# Patient Record
Sex: MALE | Race: WHITE | NOT HISPANIC OR LATINO | Employment: FULL TIME | URBAN - METROPOLITAN AREA
[De-identification: names, ages, dates, MRNs, and addresses within clinical notes are randomized per-mention and may not be internally consistent; named-entity substitution may affect disease eponyms.]

---

## 2021-07-06 ENCOUNTER — OFFICE VISIT (OUTPATIENT)
Dept: FAMILY MEDICINE | Facility: CLINIC | Age: 31
End: 2021-07-06
Payer: COMMERCIAL

## 2021-07-06 VITALS
HEIGHT: 70 IN | BODY MASS INDEX: 27.72 KG/M2 | HEART RATE: 73 BPM | SYSTOLIC BLOOD PRESSURE: 130 MMHG | WEIGHT: 193.6 LBS | DIASTOLIC BLOOD PRESSURE: 87 MMHG | TEMPERATURE: 98.3 F | OXYGEN SATURATION: 100 %

## 2021-07-06 DIAGNOSIS — Z72.0 TOBACCO ABUSE: ICD-10-CM

## 2021-07-06 DIAGNOSIS — Z11.59 NEED FOR HEPATITIS C SCREENING TEST: ICD-10-CM

## 2021-07-06 DIAGNOSIS — Z86.2 HISTORY OF MYELODYSPLASTIC SYNDROME: ICD-10-CM

## 2021-07-06 DIAGNOSIS — K64.9 HEMORRHOIDS, UNSPECIFIED HEMORRHOID TYPE: ICD-10-CM

## 2021-07-06 DIAGNOSIS — Z00.00 ROUTINE GENERAL MEDICAL EXAMINATION AT A HEALTH CARE FACILITY: Primary | ICD-10-CM

## 2021-07-06 LAB
BASOPHILS # BLD AUTO: 0 10E9/L (ref 0–0.2)
BASOPHILS NFR BLD AUTO: 0.3 %
DIFFERENTIAL METHOD BLD: NORMAL
EOSINOPHIL # BLD AUTO: 0.1 10E9/L (ref 0–0.7)
EOSINOPHIL NFR BLD AUTO: 1.3 %
ERYTHROCYTE [DISTWIDTH] IN BLOOD BY AUTOMATED COUNT: 12.4 % (ref 10–15)
HCT VFR BLD AUTO: 48.8 % (ref 40–53)
HGB BLD-MCNC: 16.5 G/DL (ref 13.3–17.7)
LYMPHOCYTES # BLD AUTO: 1.6 10E9/L (ref 0.8–5.3)
LYMPHOCYTES NFR BLD AUTO: 19.9 %
MCH RBC QN AUTO: 28.5 PG (ref 26.5–33)
MCHC RBC AUTO-ENTMCNC: 33.8 G/DL (ref 31.5–36.5)
MCV RBC AUTO: 84 FL (ref 78–100)
MONOCYTES # BLD AUTO: 0.4 10E9/L (ref 0–1.3)
MONOCYTES NFR BLD AUTO: 4.5 %
NEUTROPHILS # BLD AUTO: 5.7 10E9/L (ref 1.6–8.3)
NEUTROPHILS NFR BLD AUTO: 74 %
PLATELET # BLD AUTO: 234 10E9/L (ref 150–450)
RBC # BLD AUTO: 5.79 10E12/L (ref 4.4–5.9)
RETICS # AUTO: 148.8 10E9/L (ref 25–95)
RETICS/RBC NFR AUTO: 2.6 % (ref 0.5–2)
WBC # BLD AUTO: 7.8 10E9/L (ref 4–11)

## 2021-07-06 PROCEDURE — 85060 BLOOD SMEAR INTERPRETATION: CPT | Performed by: PATHOLOGY

## 2021-07-06 PROCEDURE — 36415 COLL VENOUS BLD VENIPUNCTURE: CPT | Performed by: PHYSICIAN ASSISTANT

## 2021-07-06 PROCEDURE — 85045 AUTOMATED RETICULOCYTE COUNT: CPT | Performed by: PHYSICIAN ASSISTANT

## 2021-07-06 PROCEDURE — 99213 OFFICE O/P EST LOW 20 MIN: CPT | Mod: 25 | Performed by: PHYSICIAN ASSISTANT

## 2021-07-06 PROCEDURE — 99N1109 PR STATISTIC MORPHOLOGY W/INTERP HISTOLOGY TC 85060: Performed by: PHYSICIAN ASSISTANT

## 2021-07-06 PROCEDURE — 99385 PREV VISIT NEW AGE 18-39: CPT | Performed by: PHYSICIAN ASSISTANT

## 2021-07-06 PROCEDURE — 86803 HEPATITIS C AB TEST: CPT | Performed by: PHYSICIAN ASSISTANT

## 2021-07-06 PROCEDURE — 85025 COMPLETE CBC W/AUTO DIFF WBC: CPT | Performed by: PHYSICIAN ASSISTANT

## 2021-07-06 ASSESSMENT — ENCOUNTER SYMPTOMS
HEARTBURN: 1
MYALGIAS: 0
HEMATURIA: 0
JOINT SWELLING: 0
DIZZINESS: 0
FREQUENCY: 0
WEAKNESS: 0
DYSURIA: 0
FEVER: 0
SORE THROAT: 0
EYE PAIN: 0
CHILLS: 0
NERVOUS/ANXIOUS: 0
PALPITATIONS: 0
HEMATOCHEZIA: 1
PARESTHESIAS: 0
SHORTNESS OF BREATH: 0
COUGH: 0
ABDOMINAL PAIN: 0
DIARRHEA: 0
HEADACHES: 0
CONSTIPATION: 1
ARTHRALGIAS: 0
NAUSEA: 0

## 2021-07-06 ASSESSMENT — PAIN SCALES - GENERAL: PAINLEVEL: NO PAIN (0)

## 2021-07-06 ASSESSMENT — MIFFLIN-ST. JEOR: SCORE: 1844.41

## 2021-07-06 NOTE — PATIENT INSTRUCTIONS
Preventive Health Recommendations  Male Ages 26 - 39    Yearly exam:             See your health care provider every year in order to  o   Review health changes.   o   Discuss preventive care.    o   Review your medicines if your doctor has prescribed any.    You should be tested each year for STDs (sexually transmitted diseases), if you re at risk.     After age 35, talk to your provider about cholesterol testing. If you are at risk for heart disease, have your cholesterol tested at least every 5 years.     If you are at risk for diabetes, you should have a diabetes test (fasting glucose).  Shots: Get a flu shot each year. Get a tetanus shot every 10 years.     Nutrition:    Eat at least 5 servings of fruits and vegetables daily.     Eat whole-grain bread, whole-wheat pasta and brown rice instead of white grains and rice.     Get adequate Calcium and Vitamin D.     Lifestyle    Exercise for at least 150 minutes a week (30 minutes a day, 5 days a week). This will help you control your weight and prevent disease.     Limit alcohol to one drink per day.     No smoking.     Wear sunscreen to prevent skin cancer.     See your dentist every six months for an exam and cleaning.     Patient Education              Patient Education     Hemorrhoids    Hemorrhoids are swollen and inflamed veins inside the rectum and near the anus. The rectum is the last several inches of the colon. The anus is the passage between the rectum and the outside of the body.   Causes  The veins can become swollen due to increased pressure in them. This is most often caused by:     Chronic constipation or diarrhea    Straining when having a bowel movement    Sitting too long on the toilet    A low-fiber diet    Pregnancy  Symptoms    Bleeding from the rectum. You may notice this after bowel movements.    Lump near the anus    Itching around the anus    Pain around the anus    Mucus leaks from the anus  There are different types of hemorrhoids.  Depending on the type you have and the severity, you may be able to treat yourself at home. In some cases, a procedure may be the best treatment option. Your healthcare provider can tell you more about this, if needed.   Home care  General care    To get relief from pain or itching, try:  ? Medicines. Your healthcare provider may recommend stool softeners, suppositories, or laxatives to help manage constipation. Use these exactly as directed.  ? Sitz baths. A sitz bath involves sitting in a few inches of warm bath water. Be careful not to make the water so hot that you burn yourself--test it before sitting in it. Soak for about 10 to 15 minutes a few times a day. This may help relieve pain.  ? Topical products. Your healthcare provider may prescribe or recommend creams, ointments, or pads that can be applied to the hemorrhoid. Use these exactly as directed.  Tips to help prevent hemorrhoids     Eat more fiber. Fiber adds bulk to stool and absorbs water as it moves through your colon. This makes stool softer and easier to pass.  ? Increase the fiber in your diet with more fiber-rich foods. These include fresh fruit, vegetables, and whole grains.  ? Take a fiber supplement or bulking agent, if advised by your healthcare provider. These include products such as psyllium or methylcellulose.    Drink more water. Your healthcare provider may direct you to drink plenty of water. This can help keep stool soft.    Be more active. Frequent exercise aids digestion and helps prevent constipation. It may also help make bowel movements more regular.    Don t strain during bowel movements. This can make hemorrhoids more likely. Also, don t sit on the toilet for long periods of time.    Follow-up care  Follow up with your healthcare provider as advised. If a culture or imaging tests were done, someone will let you know the results when they are ready. This may take a few days or longer. If your healthcare provider recommends a  procedure for your hemorrhoids, these options can be discussed. Options may include surgery and outpatient office treatments.   When to seek medical advice  Call your healthcare provider right away if any of these occur:     Increased bleeding from the rectum    Increased pain around the rectum or anus    Weakness or dizziness  Call 911  Call 911 if any of these occur:     Trouble breathing or swallowing    Fainting or loss of consciousness    Unusually fast heart rate    Vomiting blood    Large amounts of blood in stool or black, tarry stools  [employer] last reviewed this educational content on 8/1/2019 2000-2021 [employer]. All rights reserved. This information is not intended as a substitute for professional medical care. Always follow your healthcare professional's instructions.         [employer] [facility] is for any [place] looking for free support to quit smoking, vaping or chewing.   [facility] [facility] will offer many quit support options and resources so [place] residents can continue to find the way to quit that works best for them.   Free support includes personalized coaching, email and text support, educational materials, and quit medication (nicotine patches, gum or lozenges) delivered by mail.     Contact [employer] at [email or web address] or online at [employer] to receive support on your quit journey.

## 2021-07-06 NOTE — PROGRESS NOTES
SUBJECTIVE:   CC: Misael Fontana is an 30 year old male who presents for preventative health visit.    He hasn't been to the doctors since about 2012 and wants to be better about taking car of himself. He does not have any immediate health issues to talk about but does endorse ongoing issues with hemorroids managed by OTC preparation H, smoking addiction,heartburn especially after drinking alcohol, and a history of mylodisplasia that he was treated for as a kid and is curious about how his blood work looks now. He does not endorse any fever, fatigue, dizziness, nausea, abdominal pain, chest pain, or problems breathing.       Patient has been advised of split billing requirements and indicates understanding: Yes  Healthy Habits:     Getting at least 3 servings of Calcium per day:  Yes    Bi-annual eye exam:  Yes    Dental care twice a year:  NO    Sleep apnea or symptoms of sleep apnea:  None    Diet:  Regular (no restrictions)    Frequency of exercise:  1 day/week    Duration of exercise:  30-45 minutes    Taking medications regularly:  Not Applicable    Barriers to taking medications:  Not applicable    Medication side effects:  Not applicable    PHQ-2 Total Score: 0    Additional concerns today:  Yes      Today's PHQ-2 Score:   PHQ-2 ( 1999 Pfizer) 7/6/2021   Q1: Little interest or pleasure in doing things 0   Q2: Feeling down, depressed or hopeless 0   PHQ-2 Score 0   Q1: Little interest or pleasure in doing things Not at all   Q2: Feeling down, depressed or hopeless Not at all   PHQ-2 Score 0       Abuse: Current or Past(Physical, Sexual or Emotional)- NO  Do you feel safe in your environment? YES    Have you ever done Advance Care Planning? (For example, a Health Directive, POLST, or a discussion with a medical provider or your loved ones about your wishes): Declined    Social History     Tobacco Use     Smoking status: Current Every Day Smoker     Smokeless tobacco: Never Used   Substance Use Topics      Alcohol use: Yes     Alcohol/week: 0.0 standard drinks     Comment: weekly     Smoking-Starting to prioritize quitting. Discussed medication options  Alcohol-  2/day with friends 5 during the weekend. No concerns at this moment (endorsed alcohol and drug abuse run in family)    Alcohol Use 7/6/2021   Prescreen: >3 drinks/day or >7 drinks/week? Yes   AUDIT SCORE  8     AUDIT - Alcohol Use Disorders Identification Test - Reproduced from the World Health Organization Audit 2001 (Second Edition) 7/6/2021   1.  How often do you have a drink containing alcohol? 2 to 3 times a week   2.  How many drinks containing alcohol do you have on a typical day when you are drinking? 1 or 2   3.  How often do you have five or more drinks on one occasion? Weekly   4.  How often during the last year have you found that you were not able to stop drinking once you had started? Never   5.  How often during the last year have you failed to do what was normally expected of you because of drinking? Never   6.  How often during the last year have you needed a first drink in the morning to get yourself going after a heavy drinking session? Never   7.  How often during the last year have you had a feeling of guilt or remorse after drinking? Less than monthly   8.  How often during the last year have you been unable to remember what happened the night before because of your drinking? Less than monthly   9.  Have you or someone else been injured because of your drinking? No   10. Has a relative, friend, doctor or other health care worker been concerned about your drinking or suggested you cut down? No   TOTAL SCORE 8        Last PSA: No results found for: PSA      Reviewed and updated as needed this visit by clinical staff  Tobacco  Allergies  Meds   Med Hx  Surg Hx  Fam Hx  Soc Hx      PMHx -myelodysplasia as a child (pt unsure of time frame)    FMHx- No pertinent family hx    Reviewed and updated as needed this visit by Provider           "         Review of Systems   Constitutional: Negative for chills and fever.   HENT: Negative for congestion, ear pain, hearing loss and sore throat.    Eyes: Negative for pain and visual disturbance.   Respiratory: Negative for cough and shortness of breath.    Cardiovascular: Negative for chest pain, palpitations and peripheral edema.   Gastrointestinal: Positive for constipation, heartburn and hematochezia. Negative for abdominal pain, diarrhea and nausea.        Heartburn typically after drinking alcohol.  Has had difficulty with BMs being hard since he was 18. BM 2-3x/ day.  Has had chronic hemorroids, not officially diagnosed, but uses otc preperation H when needed. Only bleeds in association with hemorrhoid exacerbation. It has been 6 months since last bleeding episode.    Genitourinary: Negative for discharge, dysuria, frequency, genital sores, hematuria, impotence and urgency.   Musculoskeletal: Negative for arthralgias, joint swelling and myalgias.   Skin: Negative for rash.   Neurological: Negative for dizziness, weakness, headaches and paresthesias.   Psychiatric/Behavioral: Negative for mood changes. The patient is not nervous/anxious.          OBJECTIVE:   /87 (BP Location: Left arm, Patient Position: Chair, Cuff Size: Adult Regular)   Pulse 73   Temp 98.3  F (36.8  C) (Tympanic)   Ht 1.778 m (5' 10\")   Wt 87.8 kg (193 lb 9.6 oz)   SpO2 100%   BMI 27.78 kg/m      Physical Exam  GENERAL: healthy, alert and no distress  EYES: Eyes grossly normal to inspection, PERRL and conjunctivae and sclerae normal  HENT: ear canals and TM's normal, nose and mouth without ulcers or lesions  NECK: no adenopathy, no asymmetry, masses, or scars and thyroid normal to palpation  RESP: lungs clear to auscultation - no rales, rhonchi or wheezes  CV: regular rate and rhythm, normal S1 S2, no S3 or S4, no murmur, click or rub, no peripheral edema and peripheral pulses strong  ABDOMEN: soft, nontender, no " hepatosplenomegaly, no masses and bowel sounds normal  MS: no gross musculoskeletal defects noted, no edema  SKIN: no suspicious lesions or rashes  NEURO: Normal strength and tone, mentation intact and speech normal  PSYCH: mentation appears normal, affect normal/bright        ASSESSMENT/PLAN:   Misael was seen today for physical.    Diagnoses and all orders for this visit:    Routine general medical examination at a health care facility    Tobacco abuse    Hemorrhoids, unspecified hemorrhoid type    History of myelodysplastic syndrome  -     Blood Morphology Pathologist Review  -     CBC with platelets differential  -     Reticulocyte Count    Need for hepatitis C screening test  -     Hepatitis C Screen Reflex to HCV RNA Quant and Genotype    Misael is a well appearing 29yo M that presents for routine physical exam. While his physical exam is grossly unremarkable. Discussed continued management of his hemorrhoids with OTC preparation H and discussed implementing Metamucil, increasing dietary fiber, and apple/ prune juice to aid in the quality of BMs. Will continue to follow and look into possible surgical options if symptoms get worse or more frequent. Discussed options for smoking cessation such as medications and counseling. He is not ready now, but will revisit when he is ready. Discussed better monitoring of alcohol and healthy food intake to help with heartburn as well as potentially help with other GI symptoms. Talked about using Debrox as opposed to Q tips for earwax removal, to prevent potential of impaction or perforation. In addition to routine preventative tests, collect blood test to screen for any abnormalities given hx of mylodisplastic syndrome as a child. Also discussed general health maintenance of monitoring diet, exercise and substance abuse to maintain a healthy lifestyle. Misael is in agreement with this plan.    Patient has been advised of split billing requirements and indicates understanding:  "Yes  COUNSELING:   Reviewed preventive health counseling, as reflected in patient instructions       Regular exercise       Healthy diet/nutrition       Alcohol Use     Estimated body mass index is 27.78 kg/m  as calculated from the following:    Height as of this encounter: 1.778 m (5' 10\").    Weight as of this encounter: 87.8 kg (193 lb 9.6 oz).     Weight management plan: Discussed healthy diet and exercise guidelines    He reports that he has been smoking. He has never used smokeless tobacco.  Tobacco Cessation Action Plan:   Information offered: Patient not interested at this time      Counseling Resources:  ATP IV Guidelines  Pooled Cohorts Equation Calculator  FRAX Risk Assessment  ICSI Preventive Guidelines  Dietary Guidelines for Americans, 2010  USDA's MyPlate  ASA Prophylaxis  Lung CA Screening    -CHOLO Mosqueda-S      Patient evaluated independently by me.  History, exam, and plan personally completed  CHOLO Jennings  [facility]  "

## 2021-07-07 LAB — HCV AB SERPL QL IA: NONREACTIVE

## 2021-07-08 LAB — COPATH REPORT: NORMAL

## 2021-09-11 ENCOUNTER — HEALTH MAINTENANCE LETTER (OUTPATIENT)
Age: 31
End: 2021-09-11

## 2021-12-22 ENCOUNTER — IMMUNIZATION (OUTPATIENT)
Dept: NURSING | Facility: CLINIC | Age: 31
End: 2021-12-22
Payer: COMMERCIAL

## 2021-12-22 PROCEDURE — 0064A COVID-19,PF,MODERNA (18+ YRS BOOSTER .25ML): CPT

## 2021-12-22 PROCEDURE — 91306 COVID-19,PF,MODERNA (18+ YRS BOOSTER .25ML): CPT

## 2022-08-13 ENCOUNTER — HEALTH MAINTENANCE LETTER (OUTPATIENT)
Age: 32
End: 2022-08-13

## 2022-10-27 ENCOUNTER — OFFICE VISIT (OUTPATIENT)
Dept: FAMILY MEDICINE | Facility: CLINIC | Age: 32
End: 2022-10-27
Payer: COMMERCIAL

## 2022-10-27 VITALS
HEART RATE: 87 BPM | SYSTOLIC BLOOD PRESSURE: 126 MMHG | WEIGHT: 194.6 LBS | TEMPERATURE: 98 F | DIASTOLIC BLOOD PRESSURE: 88 MMHG | OXYGEN SATURATION: 96 % | HEIGHT: 70 IN | BODY MASS INDEX: 27.86 KG/M2

## 2022-10-27 DIAGNOSIS — Z86.2 HISTORY OF MYELODYSPLASTIC SYNDROME: ICD-10-CM

## 2022-10-27 DIAGNOSIS — N52.9 ERECTILE DYSFUNCTION, UNSPECIFIED ERECTILE DYSFUNCTION TYPE: ICD-10-CM

## 2022-10-27 DIAGNOSIS — R41.840 INATTENTION: ICD-10-CM

## 2022-10-27 DIAGNOSIS — Z11.4 SCREENING FOR HIV (HUMAN IMMUNODEFICIENCY VIRUS): Primary | ICD-10-CM

## 2022-10-27 DIAGNOSIS — Z13.6 ENCOUNTER FOR LIPID SCREENING FOR CARDIOVASCULAR DISEASE: ICD-10-CM

## 2022-10-27 DIAGNOSIS — Z00.00 ROUTINE GENERAL MEDICAL EXAMINATION AT A HEALTH CARE FACILITY: ICD-10-CM

## 2022-10-27 DIAGNOSIS — Z13.220 ENCOUNTER FOR LIPID SCREENING FOR CARDIOVASCULAR DISEASE: ICD-10-CM

## 2022-10-27 LAB
ALBUMIN SERPL-MCNC: 4.4 G/DL (ref 3.4–5)
ALP SERPL-CCNC: 55 U/L (ref 40–150)
ALT SERPL W P-5'-P-CCNC: 27 U/L (ref 0–70)
ANION GAP SERPL CALCULATED.3IONS-SCNC: 4 MMOL/L (ref 3–14)
AST SERPL W P-5'-P-CCNC: 14 U/L (ref 0–45)
BASOPHILS # BLD AUTO: 0.1 10E3/UL (ref 0–0.2)
BASOPHILS NFR BLD AUTO: 1 %
BILIRUB SERPL-MCNC: 0.5 MG/DL (ref 0.2–1.3)
BUN SERPL-MCNC: 13 MG/DL (ref 7–30)
CALCIUM SERPL-MCNC: 9.6 MG/DL (ref 8.5–10.1)
CHLORIDE BLD-SCNC: 108 MMOL/L (ref 94–109)
CHOLEST SERPL-MCNC: 193 MG/DL
CO2 SERPL-SCNC: 28 MMOL/L (ref 20–32)
CREAT SERPL-MCNC: 0.94 MG/DL (ref 0.66–1.25)
EOSINOPHIL # BLD AUTO: 0.2 10E3/UL (ref 0–0.7)
EOSINOPHIL NFR BLD AUTO: 3 %
ERYTHROCYTE [DISTWIDTH] IN BLOOD BY AUTOMATED COUNT: 13.3 % (ref 10–15)
FASTING STATUS PATIENT QL REPORTED: ABNORMAL
GFR SERPL CREATININE-BSD FRML MDRD: >90 ML/MIN/1.73M2
GLUCOSE BLD-MCNC: 100 MG/DL (ref 70–99)
HBA1C MFR BLD: 4.7 % (ref 0–5.6)
HCT VFR BLD AUTO: 47.1 % (ref 40–53)
HDLC SERPL-MCNC: 33 MG/DL
HGB BLD-MCNC: 16.1 G/DL (ref 13.3–17.7)
LDLC SERPL CALC-MCNC: 129 MG/DL
LYMPHOCYTES # BLD AUTO: 2.3 10E3/UL (ref 0.8–5.3)
LYMPHOCYTES NFR BLD AUTO: 33 %
MCH RBC QN AUTO: 28.7 PG (ref 26.5–33)
MCHC RBC AUTO-ENTMCNC: 34.2 G/DL (ref 31.5–36.5)
MCV RBC AUTO: 84 FL (ref 78–100)
MONOCYTES # BLD AUTO: 0.5 10E3/UL (ref 0–1.3)
MONOCYTES NFR BLD AUTO: 8 %
NEUTROPHILS # BLD AUTO: 3.9 10E3/UL (ref 1.6–8.3)
NEUTROPHILS NFR BLD AUTO: 56 %
NONHDLC SERPL-MCNC: 160 MG/DL
PLATELET # BLD AUTO: 246 10E3/UL (ref 150–450)
POTASSIUM BLD-SCNC: 4.3 MMOL/L (ref 3.4–5.3)
PROT SERPL-MCNC: 7.3 G/DL (ref 6.8–8.8)
RBC # BLD AUTO: 5.61 10E6/UL (ref 4.4–5.9)
SODIUM SERPL-SCNC: 140 MMOL/L (ref 133–144)
TRIGL SERPL-MCNC: 155 MG/DL
WBC # BLD AUTO: 7.1 10E3/UL (ref 4–11)

## 2022-10-27 PROCEDURE — 99395 PREV VISIT EST AGE 18-39: CPT | Performed by: FAMILY MEDICINE

## 2022-10-27 PROCEDURE — 80061 LIPID PANEL: CPT | Performed by: FAMILY MEDICINE

## 2022-10-27 PROCEDURE — 80053 COMPREHEN METABOLIC PANEL: CPT | Performed by: FAMILY MEDICINE

## 2022-10-27 PROCEDURE — 85025 COMPLETE CBC W/AUTO DIFF WBC: CPT | Performed by: FAMILY MEDICINE

## 2022-10-27 PROCEDURE — 83036 HEMOGLOBIN GLYCOSYLATED A1C: CPT | Performed by: FAMILY MEDICINE

## 2022-10-27 PROCEDURE — 36415 COLL VENOUS BLD VENIPUNCTURE: CPT | Performed by: FAMILY MEDICINE

## 2022-10-27 RX ORDER — SILDENAFIL 50 MG/1
50 TABLET, FILM COATED ORAL DAILY PRN
Qty: 10 TABLET | Refills: 3 | Status: SHIPPED | OUTPATIENT
Start: 2022-10-27

## 2022-10-27 ASSESSMENT — ENCOUNTER SYMPTOMS
DIZZINESS: 0
HEADACHES: 0
ABDOMINAL PAIN: 0
COUGH: 0
NERVOUS/ANXIOUS: 0
EYE PAIN: 0
ARTHRALGIAS: 0
CHILLS: 0
DYSURIA: 0
SHORTNESS OF BREATH: 0
HEMATURIA: 0
FREQUENCY: 0
NAUSEA: 0
SORE THROAT: 0
MYALGIAS: 0
CONSTIPATION: 0
DIARRHEA: 0
PALPITATIONS: 0
PARESTHESIAS: 0
HEARTBURN: 0
HEMATOCHEZIA: 1
JOINT SWELLING: 0
WEAKNESS: 0
FEVER: 0

## 2022-10-27 NOTE — PATIENT INSTRUCTIONS
Preventive Health Recommendations  Male Ages 26 - 39    Yearly exam:             See your health care provider every year in order to  o   Review health changes.   o   Discuss preventive care.    o   Review your medicines if your doctor has prescribed any.  You should be tested each year for STDs (sexually transmitted diseases), if you re at risk.   After age 35, talk to your provider about cholesterol testing. If you are at risk for heart disease, have your cholesterol tested at least every 5 years.   If you are at risk for diabetes, you should have a diabetes test (fasting glucose).  Shots: Get a flu shot each year. Get a tetanus shot every 10 years.     Nutrition:  Eat at least 5 servings of fruits and vegetables daily.   Eat whole-grain bread, whole-wheat pasta and brown rice instead of white grains and rice.   Get adequate Calcium and Vitamin D.     Lifestyle  Exercise for at least 150 minutes a week (30 minutes a day, 5 days a week). This will help you control your weight and prevent disease.   Limit alcohol to one drink per day.   No smoking.   Wear sunscreen to prevent skin cancer.   See your dentist every six months for an exam and cleaning.

## 2022-10-27 NOTE — PROGRESS NOTES
SUBJECTIVE:   CC: Misael is an 31 year old who presents for preventative health visit.   Patient has been advised of split billing requirements and indicates understanding: Yes  Healthy Habits:     Getting at least 3 servings of Calcium per day:  NO    Bi-annual eye exam:  Yes    Dental care twice a year:  NO    Sleep apnea or symptoms of sleep apnea:  None    Diet:  Breakfast skipped    Frequency of exercise:  1 day/week    Duration of exercise:  15-30 minutes    Taking medications regularly:  Not Applicable    Medication side effects:  Not applicable    PHQ-2 Total Score: 0    Additional concerns today:  No    GGF - colon cancer    Hemorrhoids - chronic    Sales - concern for ADHD, wants to be screened          Today's PHQ-2 Score:   PHQ-2 ( 1999 Pfizer) 10/27/2022   Q1: Little interest or pleasure in doing things 0   Q2: Feeling down, depressed or hopeless 0   PHQ-2 Score 0   PHQ-2 Total Score (12-17 Years)- Positive if 3 or more points; Administer PHQ-A if positive -   Q1: Little interest or pleasure in doing things Not at all   Q2: Feeling down, depressed or hopeless Not at all   PHQ-2 Score 0       Abuse: Current or Past(Physical, Sexual or Emotional)- No  Do you feel safe in your environment? Yes    Have you ever done Advance Care Planning? (For example, a Health Directive, POLST, or a discussion with a medical provider or your loved ones about your wishes): No, advance care planning information given to patient to review.  Patient declined advance care planning discussion at this time.    Social History     Tobacco Use     Smoking status: Every Day     Smokeless tobacco: Never   Substance Use Topics     Alcohol use: Yes     Alcohol/week: 0.0 standard drinks     Comment: weekly     If you drink alcohol do you typically have >3 drinks per day or >7 drinks per week? No    Alcohol Use 10/27/2022   Prescreen: >3 drinks/day or >7 drinks/week? No   Prescreen: >3 drinks/day or >7 drinks/week? -   AUDIT SCORE  -      AUDIT - Alcohol Use Disorders Identification Test - Reproduced from the World Health Organization Audit 2001 (Second Edition) 7/6/2021   1.  How often do you have a drink containing alcohol? 2 to 3 times a week   2.  How many drinks containing alcohol do you have on a typical day when you are drinking? 1 or 2   3.  How often do you have five or more drinks on one occasion? Weekly   4.  How often during the last year have you found that you were not able to stop drinking once you had started? Never   5.  How often during the last year have you failed to do what was normally expected of you because of drinking? Never   6.  How often during the last year have you needed a first drink in the morning to get yourself going after a heavy drinking session? Never   7.  How often during the last year have you had a feeling of guilt or remorse after drinking? Less than monthly   8.  How often during the last year have you been unable to remember what happened the night before because of your drinking? Less than monthly   9.  Have you or someone else been injured because of your drinking? No   10. Has a relative, friend, doctor or other health care worker been concerned about your drinking or suggested you cut down? No   TOTAL SCORE 8       Last PSA: No results found for: PSA    Reviewed orders with patient. Reviewed health maintenance and updated orders accordingly - Yes  BP Readings from Last 3 Encounters:   10/27/22 126/88   07/06/21 130/87   10/20/15 122/64    Wt Readings from Last 3 Encounters:   10/27/22 88.3 kg (194 lb 9.6 oz)   07/06/21 87.8 kg (193 lb 9.6 oz)   10/20/15 84.8 kg (187 lb)                    Reviewed and updated as needed this visit by clinical staff   Tobacco  Allergies  Meds              Reviewed and updated as needed this visit by Provider                     Review of Systems   Constitutional: Negative for chills and fever.   HENT: Negative for congestion, ear pain, hearing loss and sore  "throat.    Eyes: Negative for pain and visual disturbance.   Respiratory: Negative for cough and shortness of breath.    Cardiovascular: Negative for chest pain, palpitations and peripheral edema.   Gastrointestinal: Positive for hematochezia. Negative for abdominal pain, constipation, diarrhea, heartburn and nausea.   Genitourinary: Positive for impotence. Negative for dysuria, frequency, genital sores, hematuria, penile discharge and urgency.   Musculoskeletal: Negative for arthralgias, joint swelling and myalgias.   Skin: Negative for rash.   Neurological: Negative for dizziness, weakness, headaches and paresthesias.   Psychiatric/Behavioral: Negative for mood changes. The patient is not nervous/anxious.          OBJECTIVE:   /88   Pulse 87   Temp 98  F (36.7  C) (Oral)   Ht 1.79 m (5' 10.47\")   Wt 88.3 kg (194 lb 9.6 oz)   SpO2 96%   BMI 27.55 kg/m      Physical Exam  Constitutional:       General: He is not in acute distress.     Appearance: He is well-developed. He is not ill-appearing.   HENT:      Head: Normocephalic and atraumatic.      Right Ear: Tympanic membrane, ear canal and external ear normal.      Left Ear: Tympanic membrane, ear canal and external ear normal.      Nose: Nose normal.      Mouth/Throat:      Mouth: Mucous membranes are moist. No oral lesions.   Eyes:      Extraocular Movements: Extraocular movements intact.      Conjunctiva/sclera: Conjunctivae normal.   Neck:      Thyroid: No thyromegaly.      Trachea: No tracheal deviation.   Cardiovascular:      Rate and Rhythm: Normal rate and regular rhythm.      Heart sounds: Normal heart sounds, S1 normal and S2 normal. No murmur heard.    No S3 or S4 sounds.   Pulmonary:      Effort: Pulmonary effort is normal. No respiratory distress.      Breath sounds: Normal breath sounds. No wheezing or rales.   Abdominal:      General: Bowel sounds are normal.      Palpations: Abdomen is soft. There is no mass.      Tenderness: There is no " abdominal tenderness.   Musculoskeletal:         General: No deformity. Normal range of motion.      Cervical back: Neck supple.   Lymphadenopathy:      Cervical: No cervical adenopathy.   Skin:     General: Skin is warm and dry.      Findings: No lesion or rash.   Neurological:      Mental Status: He is alert and oriented to person, place, and time.      Motor: No abnormal muscle tone.      Deep Tendon Reflexes: Reflexes are normal and symmetric.   Psychiatric:         Speech: Speech normal.         Behavior: Behavior normal.         Thought Content: Thought content normal.         Judgment: Judgment normal.               ASSESSMENT/PLAN:       ICD-10-CM    1. Screening for HIV (human immunodeficiency virus)  Z11.4       2. Routine general medical examination at a health care facility  Z00.00 REVIEW OF HEALTH MAINTENANCE PROTOCOL ORDERS     Lipid panel reflex to direct LDL Fasting     Comprehensive metabolic panel     Hemoglobin A1c     Lipid panel reflex to direct LDL Fasting     Comprehensive metabolic panel     Hemoglobin A1c      3. Encounter for lipid screening for cardiovascular disease  Z13.220 Lipid panel reflex to direct LDL Fasting    Z13.6 Lipid panel reflex to direct LDL Fasting      4. BMI 27.0-27.9,adult  Z68.27 Hemoglobin A1c     Hemoglobin A1c      5. Erectile dysfunction, unspecified erectile dysfunction type  N52.9 sildenafil (VIAGRA) 50 MG tablet      6. Inattention  R41.840 Adult Mental Health  Referral      7. History of myelodysplastic syndrome  Z86.2 CBC with platelets and differential     Lab Blood Morphology Pathologist Review     CBC with platelets and differential          Patient has been advised of split billing requirements and indicates understanding: Yes      COUNSELING:   Reviewed preventive health counseling, as reflected in patient instructions       Regular exercise       Healthy diet/nutrition    Estimated body mass index is 27.55 kg/m  as calculated from the  "following:    Height as of this encounter: 1.79 m (5' 10.47\").    Weight as of this encounter: 88.3 kg (194 lb 9.6 oz).         He reports that he has been smoking. He has never used smokeless tobacco.  Nicotine/Tobacco Cessation Plan:   Information offered: Patient not interested at this time      Counseling Resources:  ATP IV Guidelines  Pooled Cohorts Equation Calculator  FRAX Risk Assessment  ICSI Preventive Guidelines  Dietary Guidelines for Americans, 2010  USDA's MyPlate  ASA Prophylaxis  Lung CA Screening    Arnie Cook MD  [facility]  "

## 2022-10-29 ENCOUNTER — HEALTH MAINTENANCE LETTER (OUTPATIENT)
Age: 32
End: 2022-10-29

## 2023-05-01 ENCOUNTER — VIRTUAL VISIT (OUTPATIENT)
Dept: PSYCHOLOGY | Facility: CLINIC | Age: 33
End: 2023-05-01
Attending: FAMILY MEDICINE
Payer: COMMERCIAL

## 2023-05-01 DIAGNOSIS — F41.9 ANXIETY DISORDER, UNSPECIFIED TYPE: Primary | ICD-10-CM

## 2023-05-01 DIAGNOSIS — R41.840 INATTENTION: ICD-10-CM

## 2023-05-01 PROCEDURE — 90834 PSYTX W PT 45 MINUTES: CPT | Mod: VID

## 2023-05-01 ASSESSMENT — COLUMBIA-SUICIDE SEVERITY RATING SCALE - C-SSRS
6. HAVE YOU EVER DONE ANYTHING, STARTED TO DO ANYTHING, OR PREPARED TO DO ANYTHING TO END YOUR LIFE?: NO
2. HAVE YOU ACTUALLY HAD ANY THOUGHTS OF KILLING YOURSELF IN THE PAST MONTH?: NO
4. HAVE YOU HAD THESE THOUGHTS AND HAD SOME INTENTION OF ACTING ON THEM?: NO
1. IN THE PAST MONTH, HAVE YOU WISHED YOU WERE DEAD OR WISHED YOU COULD GO TO SLEEP AND NOT WAKE UP?: NO
5. HAVE YOU STARTED TO WORK OUT OR WORKED OUT THE DETAILS OF HOW TO KILL YOURSELF? DO YOU INTEND TO CARRY OUT THIS PLAN?: NO
3. HAVE YOU BEEN THINKING ABOUT HOW YOU MIGHT KILL YOURSELF?: NO
2. HAVE YOU ACTUALLY HAD ANY THOUGHTS OF KILLING YOURSELF LIFETIME?: NO
1. IN THE PAST MONTH, HAVE YOU WISHED YOU WERE DEAD OR WISHED YOU COULD GO TO SLEEP AND NOT WAKE UP?: NO

## 2023-05-01 ASSESSMENT — ANXIETY QUESTIONNAIRES
IF YOU CHECKED OFF ANY PROBLEMS ON THIS QUESTIONNAIRE, HOW DIFFICULT HAVE THESE PROBLEMS MADE IT FOR YOU TO DO YOUR WORK, TAKE CARE OF THINGS AT HOME, OR GET ALONG WITH OTHER PEOPLE: SOMEWHAT DIFFICULT
3. WORRYING TOO MUCH ABOUT DIFFERENT THINGS: NOT AT ALL
1. FEELING NERVOUS, ANXIOUS, OR ON EDGE: SEVERAL DAYS
7. FEELING AFRAID AS IF SOMETHING AWFUL MIGHT HAPPEN: SEVERAL DAYS
2. NOT BEING ABLE TO STOP OR CONTROL WORRYING: NOT AT ALL
5. BEING SO RESTLESS THAT IT IS HARD TO SIT STILL: NEARLY EVERY DAY
GAD7 TOTAL SCORE: 10
6. BECOMING EASILY ANNOYED OR IRRITABLE: MORE THAN HALF THE DAYS
GAD7 TOTAL SCORE: 10

## 2023-05-01 ASSESSMENT — PATIENT HEALTH QUESTIONNAIRE - PHQ9
SUM OF ALL RESPONSES TO PHQ QUESTIONS 1-9: 7
5. POOR APPETITE OR OVEREATING: NEARLY EVERY DAY
10. IF YOU CHECKED OFF ANY PROBLEMS, HOW DIFFICULT HAVE THESE PROBLEMS MADE IT FOR YOU TO DO YOUR WORK, TAKE CARE OF THINGS AT HOME, OR GET ALONG WITH OTHER PEOPLE: VERY DIFFICULT
SUM OF ALL RESPONSES TO PHQ QUESTIONS 1-9: 7

## 2023-05-01 NOTE — PROGRESS NOTES
[facility]   Mental Health & Addiction Services     Disclaimer: Voice recognition software was used to generate this note. As a result, wrong word or 'sound-a-like' substitutions may have occurred due to the inherent limitations of voice recognition software. There may be errors in the script that have gone undetected. Please consider this when interpreting information found in this chart.     ADHD assessment - Initial Visit    Patient  Name:  Misael Fontana Date: 23         Service Type: Individual     Visit Start Time: 11:00 am  Visit End Time: 11:48 am    Visit #: 1    Attendees: Client attended alone    Service Modality:  Video Visit:      Provider verified identity through the following two step process.  Patient provided:  Patient     Telemedicine Visit: The patient's condition can be safely assessed and treated via synchronous audio and visual telemedicine encounter.      Reason for Telemedicine Visit: Services only offered telehealth    Originating Site (Patient Location): Patient's place of employment    Distant Site (Provider Location): [facility] MENTAL [facility] & ADDICTION [facility]    Consent:  The patient/guardian has verbally consented to: the potential risks and benefits of telemedicine (video visit) versus in person care; bill my insurance or make self-payment for services provided; and responsibility for payment of non-covered services.     Patient would like the video invitation sent by:  My Chart    Mode of Communication:  Video Conference via [facility]    Distant Location (Provider):  On-site    As the provider I attest to compliance with applicable laws and regulations related to telemedicine.    Provider Location: Off-Site     DATA:   Interactive Complexity: No   Crisis: No     Presenting Concerns/  Current Stressors:   Client presents for session 1 of ADHD assessment. He has had some challenges with work and at times in relationships.  His SYLWIA 7 was 10, PHQ9 7. He endorsed 9 of 9 inattention symptoms and 4 of 9 hyperactivity/impulsivity symptoms Currently and additional hyperactivity from childhood.     Reason for doing assessment now: The patient wants to better understand his symptoms and determine if he has ADHD.    Previous testing, diagnoses, medication: no previous ADHD testing or diagnosis, did have counseling in 2019    Hospitalizations, suicide attempts, thoughts: none lifetime    Family mental health history:     Grade school/Middle school experience: the patient stated that he got into trouble, misbehavior penitentiary but not suspended    Potential talks: none    Hyperactivity, disruptive behavior: Yes above - skipped classes in college failed some classes    Legal involvement: Went into another person's apartment when he was under the influence of too much alcohol, was arrested, did community service. Afterward sought counseling on his own.    Family CD history: The patient stated that he is addicted to nicotine. His sister has been in and out of rehab for addiction.    Current drinking, Cannabis use: Alcohol - once per week 4-5 drinks at a time  Cannabis does not use        ASSESSMENT:  Mental Status Assessment:  Appearance:   Appropriate   Eye Contact:   Good   Psychomotor Behavior: Restless   Attitude:   Cooperative   Orientation:   All  Speech   Rate / Production: Normal/ Responsive   Volume:  Normal   Mood:    Anxious   Affect:    Restricted   Thought Content:  Clear   Thought Form:  Logical   Insight:    Fair       Safety Issues and Plan for Safety and Risk Management:   [place] Suicide Severity Rating Scale (Lifetime/Recent)      5/1/2023    11:00 AM   [place] Suicide Severity Rating (Lifetime/Recent)   Wish to be Dead (Lifetime) No   Non-Specific Active Suicidal Thoughts (Lifetime) No   Q1 Wished to be Dead (Past Month) no   Q2 Suicidal Thoughts (Past Month) no   Q3 Suicidal Thought Method no   Q4 Suicidal Intent without  Specific Plan no   Q5 Suicide Intent with Specific Plan no   Q6 Suicide Behavior (Lifetime) no   Level of Risk per Screen low risk     Patient denies current fears or concerns for personal safety.  Patient denies current or recent suicidal ideation or behaviors.  Patient denies current or recent homicidal ideation or behaviors.  Patient denies current or recent self injurious behavior or ideation.  Patient denies other safety concerns.  Recommended that patient call 911 or go to the local ED should there be a change in any of these risk factors.  Patient reports there are no firearms in the house.     Diagnostic Criteria:  Unspecified Anxiety Disorder , Symptoms characteristic of an anxiety disorder that caused clinically significant distress or impairment in social, occupational, or other important areas of functioning predominate but do not meet the full criteria for any of the disorders of the anxiety disorders diagnostic class.      DSM5 Diagnoses: (Sustained by DSM5 Criteria Listed Above)  Diagnoses: 300.00 (F41.9) Unspecified Anxiety Disorder  Psychosocial & Contextual Factors: The patient wishes to be a better person, and is noticing some difficulty at work and in relationships    Intervention:   Educated on treatment planning and started identifying goals and interventions for treatment plan    Collateral Reports Completed:  Not Applicable      PLAN: (Homework, other):  1. Provider will continue Diagnostic Assessment.  Patient was given the following to do until next session: Complete ADHD rating scales.    2. Provider recommended the following referrals: none at this time.      3.  Suicide Risk and Safety Concerns were assessed for Misael Fontana.    Patient meets the following risk assessment and triage: Patient denied any current/recent/lifetime history of suicidal ideation and/or behaviors.  No safety plan indicated at this time.       Vikki Dominguez  May 1, 2023      Answers for HPI/ROS submitted  by the patient on 5/1/2023  If you checked off any problems, how difficult have these problems made it for you to do your work, take care of things at home, or get along with other people?: Very difficult  PHQ9 TOTAL SCORE: 7

## 2023-05-08 ENCOUNTER — VIRTUAL VISIT (OUTPATIENT)
Dept: PSYCHOLOGY | Facility: CLINIC | Age: 33
End: 2023-05-08
Attending: FAMILY MEDICINE
Payer: COMMERCIAL

## 2023-05-08 DIAGNOSIS — F41.8 OTHER SPECIFIED ANXIETY DISORDERS: Primary | ICD-10-CM

## 2023-05-08 PROCEDURE — 90791 PSYCH DIAGNOSTIC EVALUATION: CPT | Mod: VID

## 2023-05-08 NOTE — PROGRESS NOTES
[facility]    Disclaimer: Voice recognition software was used to generate this note. As a result, wrong word or 'sound-a-like' substitutions may have occurred due to the inherent limitations of voice recognition software. There may be errors in the script that have gone undetected. Please consider this when interpreting information found in this chart.     Provider Name:   Vikki Dominguez, Doctoral Student   Supervisor:  Nikko Sinha PhD, LP    PATIENT'S NAME: Misael Fontana  PREFERRED NAME: Misael  PRONOUNS:       MRN: [id number]  : 1990  ADDRESS: [address] [place] [address] [address]  [facility] [address]  ACCT. NUMBER:  [id number]  DATE OF SERVICE: 23  START TIME: 1:03 pm   END TIME: 1:35 pm  PREFERRED PHONE: [phone]  May we leave a program related message: Yes  SERVICE MODALITY:  Video Visit:      Provider verified identity through the following two step process.  Patient provided:  Patient is known previously to provider    Telemedicine Visit: The patient's condition can be safely assessed and treated via synchronous audio and visual telemedicine encounter.      Reason for Telemedicine Visit: Services only offered telehealth    Originating Site (Patient Location): Patient's place of employment    Distant Site (Provider Location): [facility] MENT[facility] & [facility]    Consent:  The patient/guardian has verbally consented to: the potential risks and benefits of telemedicine (video visit) versus in person care; bill my insurance or make self-payment for services provided; and responsibility for payment of non-covered services.     Patient would like the video invitation sent by:  My Chart    Mode of Communication:  Video Conference via [facility]    Distant Location (Provider):  On-site    As the provider I attest to compliance with applicable laws and regulations related to telemedicine.    UNIVERSAL ADULT ADHD DIAGNOSTIC ASSESSMENT    Identifying  Information:  Patient is a 32 year old,   .male.  The pronoun use throughout this assessment reflects the patient's chosen pronoun.  Patient was referred for an assessment by primary care providerreferring provider.  Patient attended the session alone.    Chief Complaint:   The purpose of this evaluation is to: provide treatment recommendations and clarify diagnosis. Patient reported seeking services at this time for diagnostic assessment and recommendations for treatment.  Patient reported that      has not completed a previous ADHD diagnostic assessment.  Patient has not received a previous diagnosis of ADHD. Patient reported that medication has not been prescribed medication to address these problems.     Client Reports:  The patient stated that he needs information on his mental status and ability to be present. He endorsed 9 of 9 inattentive and 5 of 9 hyperactivity/impulsivity symptoms on intake. He stated that it was a big step to take the time to prioritize his health, including mental health. He seeks diagnostic clarity, psychological education and treatment recommendations as appropriate.     Social/Family History:  Patient reported they grew up in [place], [place].  They were raised by biological parents.  Parents stayed ..  Patient reported that their childhood was good.  Patient described their current relationships with family of origin as good and supportive.      The patient describes their cultural background as no specifics endorsed.    Patient identified their preferred language to be english Patient reported they do not need the assistance of an  or other support involved in therapy.     Patient reported had no significant delays in developmental tasks.   Patient's highest education level was college graduate. Patient identified the following learning problems: concentration.  Modifications will not be used to assist communication in therapy. Patient reports they are  able to understand written materials.      Patient reported the following relationship history one relationship endorsed and stated they had communication issues, and that he would forget important events, needed personal space. The relationship ended amicably.   Patient identified their sexual orientation as heterosexual.  Patient reported having zero child(chacha). Patient identified immediate family and close friends as part of their support system.  Patient identified the quality of these relationships as stable and meaningful.      Patient's current living/housing situation involves staying in own home/apartment.  They live with self and they report that housing is stable.     Patient is currently employed full time and reports they are able to function appropriately at work.. However, the patient states that when he makes a mistake, he stews over it for long periods of time. Patient reports their finances are obtained through employment.  Patient does not identify finances as a current stressor.      Patient reported that they have been involved with the legal system.  The patient was inebriated and cannot recollect his actions when entering housing complex he went into wrong residence, police were called he was senior living for two nights and completed community service. He has since been mindful of frequency and especially amounts of alcohol consumed to avoid any future episodes. Patient denies being on probation / parole / under the jurisdiction of the court.    ADHD Symptom History  Client's highest education level was college graduate. During the elementary, middle, and high school years, patient recalls academic strengths in the area of writing. Client reported experiencing academic problems in reading and math.. Client did not identify any learning problems. Client did not receive tutoring services during the school years. Client did not receive special education services. Client reported no particular problems  during the school years. Client did attend post secondary school.   Client reported no difficulty with childhood peer relationships.As a child, client reported that he Careless mistakes/inattention to detail, difficulty sustaining attention, does not seem to listen when spoken to, difficulty organizing tasks, does not follow through on work/chores, often loses or misplaces items, avoids tasks that require mental effort, easily distracted  forgetful, fidgety, often leaves seat, inappropriate running/climbing, and feeling on the go or driven by a motor. .   Client reported that he is currently employed. Client reported that the current job is a good fit for his skills and personality.  Client reported that he loves his job, never late, great fit, and supportive environment. .  The client's work history includes: working as an .  The longest period of employment has been 6 years.  Client has not been terminated from a place of employment.As a child, client reported having regular and consistent sleep patterns.  Client reported currently experiencing difficulty falling asleep, purchased a new bed to support relaxing and falling asleep..  Client reported that he has not completed a sleep study.  Client reported having generally healthy, 2 meals per day..  There are not significant nutritional concerns.  Client reported sporadic exercise patterns.    Patient has received a 's license.  Patient DUI..  Patient reported the following driving habits: attentive and cautious.  According to client, other people are comfortable riding as passengers when he is driving.     Patient's Strengths and Limitations:  Patient identified the following strengths or resources that will help them succeed in treatment: commitment to health and well being. Things that may interfere with the patient's success in treatment include: not prioritzing his health when allocating his time..     Personal and Family Medical  History:  Patient   report a family history of mental health concerns.  Patient reports family history is not on file..     Patient does not report Mental Health Diagnosis or Treatment.      Patient has had a physical exam to rule out medical causes for current symptoms.  Date of last physical exam was within the past year. Client was encouraged to follow up with PCP if symptoms were to develop. The patient has a [facility] Provider, who is named [facility]..  Patient reports no current medical concerns.  Patient denies any issues with pain..   There are not significant appetite / nutritional concerns / weight changes.   Patient does not report a history of head injury / trauma / cognitive impairment.      Patient reports not taking any current medications    Medication Adherence:  Patient reports  .  did not endorse taking medications..    Patient Allergies:    Allergies   Allergen Reactions     Pcn [Penicillins]      Rash         Medical History:  No past medical history on file.      Substance Use:  Patient did report a family history of substance use concerns; see medical history section for details.  Patient has not received chemical dependency treatment in the past.  Patient has not ever been to detox.      Patient is not currently receiving any chemical dependency treatment.           Substance History of use Age of first use Date of last use     Pattern and duration of use (include amounts and frequency)   Alcohol         REPORTS SUBSTANCE USE: reports using substance 1 times per week and has 4 drinks at a time.   Patient reports heaviest use was three years ago..   Cannabis         REPORTS SUBSTANCE USE: N/A     Amphetamines         REPORTS SUBSTANCE USE: N/A   Cocaine/crack            REPORTS SUBSTANCE USE: N/A   Hallucinogens           REPORTS SUBSTANCE USE: N/A   Inhalants           REPORTS SUBSTANCE USE: N/A   Heroin           REPORTS SUBSTANCE USE: N/A   Other  Opiates       REPORTS SUBSTANCE USE: N/A   Benzodiazepine         REPORTS SUBSTANCE USE: N/A   Barbiturates       REPORTS SUBSTANCE USE: N/A   Over the counter meds       REPORTS SUBSTANCE USE: N/A   Caffeine       REPORTS SUBSTANCE USE: reports using substance 3 times per day and has 1 cup of coffee at a time.   Patient reports heaviest use is current use.   Nicotine        REPORTS SUBSTANCE USE: reports using substance 10 times per day and has 1 cigarette at a time.   Patient reports heaviest use is current use.   Other substances not listed above:  Identify:        REPORTS SUBSTANCE USE: N/A     Patient reported the following problems as a result of their substance use:  .     CAGE- AID:        5/1/2023    11:06 AM   CAGE-AID Total Score   Total Score 2       Substance Use: history of overuse and no recollection of inappropriate behavior. Currently carefully monitoring intake.    Based on the positive CAGE score and clinical interview there  are not indications of drug or alcohol abuse.      Current Mental Status Exam:   Appearance:  Appropriate    Eye Contact:  Poor  Psychomotor:  Normal       Gait / station:  Virtual visit  Attitude / Demeanor: Cooperative   Speech      Rate / Production: Normal/ Responsive      Volume:  Normal  volume      Language:  intact  Mood:   Anxious   Affect:   Flat    Thought Content: Rumination   Thought Process: Coherent       Associations: No loosening of associations  Insight:   Poor   Judgment:  Intact   Orientation:  All  Attention/concentration: Limited    Rating Scales:    PHQ9:        5/1/2023    10:58 AM   PHQ-9 SCORE   PHQ-9 Total Score MyChart 7 (Mild depression)   PHQ-9 Total Score 7       GAD7:        5/1/2023    11:06 AM   SYLWIA-7 SCORE   Total Score 10     CGI:     First:No data recorded    Most recentNo data recorded      Significant Losses / Trauma / Abuse / Neglect Issues:   Patient   did not serve in the .  There are indications or report of significant loss,  trauma, abuse or neglect issues related to: are indications or report of significant loss, trauma, abuse or neglect issues related to.  Concerns for possible neglect are not present.     Safety Assessment:   Current Safety Concerns:  [place] Suicide Severity Rating Scale (Lifetime/Recent)      5/1/2023    11:00 AM   [place] Suicide Severity Rating (Lifetime/Recent)   Wish to be Dead (Lifetime) No   Non-Specific Active Suicidal Thoughts (Lifetime) No   Q1 Wished to be Dead (Past Month) no   Q2 Suicidal Thoughts (Past Month) no   Q3 Suicidal Thought Method no   Q4 Suicidal Intent without Specific Plan no   Q5 Suicide Intent with Specific Plan no   Q6 Suicide Behavior (Lifetime) no   Level of Risk per Screen low risk     Patient denies current homicidal ideation and behaviors.  Patient denies current self-injurious ideation and behaviors.    Patient denied risk behaviors associated with substance use.  Patient denies any high risk behaviors associated with mental health symptoms.  Patient reports the following current concerns for their personal safety: None.  Patient reports there   firearms in the house.       There are no firearms in the home..    History of Safety Concerns:  Patient denied a history of homicidal ideation.     Patient denied a history of personal safety concerns.    Patient denied a history of assaultive behaviors.    Patient denied a history of sexual assault behaviors.     Patient denied a history of risk behaviors associated with substance use.  Patient denies any history of high risk behaviors associated with mental health symptoms.  Patient reports the following protective factors:      Risk Plan:  See Recommendations for Safety and Risk Management Plan    Review of Symptoms per patient report:  Depression: No symptoms and Low mood, loss of energy, feelings of worthlessness, concentration problems.  Thao:  No Symptoms  Psychosis: No Symptoms  Anxiety: Nervous, anxious and on edge, trouble  relaxing, restlessness, feeling as though something awful will happen, and irritability  Panic:  No symptoms  Post Traumatic Stress Disorder:  No Symptoms   Eating Disorder: No Symptoms  ADD / ADHD:  symptoms listed above.  Conduct Disorder: No symptoms  Autism Spectrum Disorder: No symptoms  Obsessive Compulsive Disorder: No Symptoms    Patient reports the following compulsive behaviors and treatment history: none..      Diagnostic Criteria:   Other Specified Anxiety Disorder - above symptoms but does not endorse worry.    Functional Status:  Patient reports the following functional impairments: relationship(s).       Nonprogrammatic care:  Patient is requesting basic services to address current mental health concerns.    Clinical Summary:  1. Reason for assessment: ADHD Evaluation .    3. Principal DSM5 Diagnoses  (Sustained by DSM5 Criteria Listed Above):   300.09 (F41.8) Other Specified Anxiety Disorder .  4. Other Diagnoses that is relevant to services:   300.09 (F41.8) Other Specified Anxiety Disorder .  5. Provisional Diagnosis:  Attention-Deficit/Hyperactivity Disorder  314.01 (F90.2) Combined presentation as evidenced by symptoms listed above, assessments to follow .  6. Prognosis: Expect Improvement.  7. Likely consequences of symptoms if not treated: worsening depression and anxiety.  8. Client strengths include:  open to learning .     Recommendations:     1. Plan for Safety and Risk Management:   Recommended that patient call 911 or go to the local ED should there be a change in any of these risk factors..          Report to child / adult protection services was NA.         3. Initial Treatment will focus on:    ADHD Testing:  Patient was given self and collaborative rating scales to be completed prior to the next appointment.  Depression and anxiety rating scales were completed.  Copies of na were requested. .     4. Resources/Service Plan:    services are not indicated.   Modifications to  assist communication are not indicated.   Additional disability accommodations are not indicated.      5. Collaboration:   Collaboration / coordination of treatment will be initiated with the following support professionals: Not indicated.      6.  Referrals:   The following referral(s) will be initiated: N/A .   A Release of Information has been obtained for the following:  N/A     7. SCOTT:    SCOTT:  Discussed Discussed the general effects of drugs and alcohol on health and well-being. Provider gave patient printed information about the effects of chemical use on their health and well being. Recommendations:  None at this time .     8. Records:   These were reviewed at time of assessment.   Information in this assessment was obtained from the medical record and  provided by patient who is a fair historian.    Patient will have open access to their mental health medical record.      Provider Name/ Credentials: Vikki Dominguez, Doctoral Student Psychology    Nikko Sinha PhD, LP May 8, 2023

## 2023-06-03 ENCOUNTER — DOCUMENTATION ONLY (OUTPATIENT)
Dept: PSYCHOLOGY | Facility: CLINIC | Age: 33
End: 2023-06-03
Payer: COMMERCIAL

## 2023-06-03 PROCEDURE — 99207 PR NO BILLABLE SERVICE THIS VISIT: CPT

## 2023-06-03 NOTE — PROGRESS NOTES
6/3/23    The patient has not completed any assessments in the past month. Will discharge from psychological assessment. He may want to consider working with another psychologist. I am leaving [facility] in a few weeks.    Vikki Dominguez, Doctoral Student Psychology

## 2024-01-20 ENCOUNTER — HEALTH MAINTENANCE LETTER (OUTPATIENT)
Age: 34
End: 2024-01-20

## 2025-01-26 ENCOUNTER — HEALTH MAINTENANCE LETTER (OUTPATIENT)
Age: 35
End: 2025-01-26